# Patient Record
(demographics unavailable — no encounter records)

---

## 2017-08-08 NOTE — CP.PCM.PN
Subjective





- Date & Time of Evaluation


Date of Evaluation: 08/08/17


Time of Evaluation: 11:20





- Subjective


Subjective: 





cardaic cath performed via the R radial artery.  multivessel CAD. Normal LV 

function.  will need CABG.  xfer to [facility].

## 2017-08-09 NOTE — CARD
--------------- APPROVED REPORT --------------





Procedure(s) performed: Left Heart Catheterization

Left Ventriculogram

Selective Right and Left Coronary Angiography



HISTORY



transplant, peripheral vascular disease, hypertension , dyslipidemia .



INDICATION

The indication(s) include : unstable angina .



CASE TECHNIQUE

The patient was brought electively to the Cardiac Catheterization 

Laboratory in a fasting state and was prepped and draped in a sterile 

manner. The right wrist was infiltrated with 2% Lidocaine 

subcutaneous anesthesia. A 6 Fr Glidesheath (Radial) sheath was 



performed using coronary diagnostic catheters. The left coronary 

system was accessed and visualized with a 5 Fr x 100 cm Ethan 3.5 

(Radial) catheter. The right coronary system was accessed and 

visualized with a 5 Fr x 100 cm Ethan 3.5 (Radial) catheter. The left 

ventricle was accessed and visualized with a 5 Fr JR 3.5 catheter. 

Left ventricular/Aortic Valve gradient assessed on pullback. Left 

ventriculogram was performed in CAMEJO projection. Closure device was 

deployed with a Fr TR Band (Regular) without any complications. The 

patient tolerated the procedure well and there were no complications 

associated with the procedure. 



Vessel Analysis

The patient's coronary anatomy is left dominant.



The left main coronary artery is a medium size vessel without 

significant stenosis. The left main bifurcates to the left anterior 

descending and circumflex.



The left anterior descending artery is a medium size vessel . There 

is a 90% stenosis in the mid segment. There are multiple sequential 

stenosis. There is a 90% stenosis of the proximal segment. The first 

diagonal branch is a medium size vessel . There is a 90% stenosis in 

the mid segment. 



The circumflex artery is a medium size vessel . There is a 80% 

stenosis in the proximal segment. There is a 90% stenosis of the mid 

vessel The first obtuse marginal branch is a medium size vessel . 

There is a 100% stenosis in the proximal segment. Collaterals supply 

the distal vessel



The right coronary artery is a small size vessel The vessel is 

nondominant. There is a 90% stenosis in the mid segment. 



Left Ventricle

The left ventricular ejection fraction is estimated to be 55%. There 

was no gradient across the aortic valve upon pullback.



Conclusion

Severe multivessel CAD.

Normal left ventricular function.



Recommendations

Aggressive Medical Therapy

Recommend cardiothoracic surgical evaluation for CABG.